# Patient Record
Sex: MALE | Race: WHITE | Employment: FULL TIME | URBAN - METROPOLITAN AREA
[De-identification: names, ages, dates, MRNs, and addresses within clinical notes are randomized per-mention and may not be internally consistent; named-entity substitution may affect disease eponyms.]

---

## 2018-08-17 ENCOUNTER — OFFICE VISIT (OUTPATIENT)
Dept: PRIMARY CARE CLINIC | Age: 38
End: 2018-08-17
Payer: COMMERCIAL

## 2018-08-17 VITALS
HEART RATE: 68 BPM | BODY MASS INDEX: 33.37 KG/M2 | WEIGHT: 251.8 LBS | RESPIRATION RATE: 14 BRPM | SYSTOLIC BLOOD PRESSURE: 120 MMHG | DIASTOLIC BLOOD PRESSURE: 80 MMHG | HEIGHT: 73 IN | OXYGEN SATURATION: 97 % | TEMPERATURE: 98.4 F

## 2018-08-17 DIAGNOSIS — K21.9 GASTROESOPHAGEAL REFLUX DISEASE WITHOUT ESOPHAGITIS: ICD-10-CM

## 2018-08-17 DIAGNOSIS — Z00.00 ANNUAL PHYSICAL EXAM: Primary | ICD-10-CM

## 2018-08-17 PROCEDURE — 99385 PREV VISIT NEW AGE 18-39: CPT | Performed by: FAMILY MEDICINE

## 2018-08-17 ASSESSMENT — PATIENT HEALTH QUESTIONNAIRE - PHQ9
SUM OF ALL RESPONSES TO PHQ QUESTIONS 1-9: 0
SUM OF ALL RESPONSES TO PHQ QUESTIONS 1-9: 0
SUM OF ALL RESPONSES TO PHQ9 QUESTIONS 1 & 2: 0
1. LITTLE INTEREST OR PLEASURE IN DOING THINGS: 0
2. FEELING DOWN, DEPRESSED OR HOPELESS: 0

## 2018-08-17 ASSESSMENT — ENCOUNTER SYMPTOMS
GASTROINTESTINAL NEGATIVE: 1
BACK PAIN: 0
COUGH: 0
BLOOD IN STOOL: 0
CHEST TIGHTNESS: 0
SHORTNESS OF BREATH: 0
CONSTIPATION: 0
VOMITING: 0
RESPIRATORY NEGATIVE: 1
EYE DISCHARGE: 0
NAUSEA: 0
DIARRHEA: 0
APNEA: 0
PHOTOPHOBIA: 0
EYES NEGATIVE: 1
ABDOMINAL PAIN: 0

## 2018-08-17 NOTE — PROGRESS NOTES
Subjective:      Patient ID: Lory Pfeiffer is a 43 y.o. male who presents today for:  Chief Complaint   Patient presents with    Annual Exam     Pt. is here today for a physical exam for work. He does have acid reflux and has been taking nexium for 2 years. HPI  Annual exam  Pt presents today for an work physical. He does have acid reflux and takes Nexium with relief. He states he feels well today. History reviewed. No pertinent past medical history. History reviewed. No pertinent surgical history. History reviewed. No pertinent family history. Social History     Social History    Marital status: Single     Spouse name: N/A    Number of children: N/A    Years of education: N/A     Occupational History    Not on file. Social History Main Topics    Smoking status: Never Smoker    Smokeless tobacco: Never Used    Alcohol use 1.2 oz/week     1 Glasses of wine, 1 Cans of beer per week      Comment: occaisonal    Drug use: No    Sexual activity: Yes     Partners: Female     Other Topics Concern    Not on file     Social History Narrative    No narrative on file     Allergies:  Patient has no allergy information on record. Review of Systems   Constitutional: Negative. Negative for activity change, appetite change, fatigue and fever. HENT: Negative. Negative for congestion, nosebleeds and tinnitus. Eyes: Negative. Negative for photophobia, discharge and visual disturbance. Respiratory: Negative. Negative for apnea, cough, chest tightness and shortness of breath. Cardiovascular: Negative. Negative for chest pain and palpitations. Gastrointestinal: Negative. Negative for abdominal pain, blood in stool, constipation, diarrhea, nausea and vomiting. Genitourinary: Negative. Negative for dysuria, frequency, hematuria and urgency. Musculoskeletal: Negative. Negative for back pain and neck pain. Skin: Negative. Negative for pallor. Neurological: Negative.   Negative for dizziness, syncope, speech difficulty, weakness, light-headedness and headaches. Psychiatric/Behavioral: Negative. Objective:   /80 (Site: Left Arm, Position: Sitting, Cuff Size: Large Adult)   Pulse 68   Temp 98.4 °F (36.9 °C) (Oral)   Resp 14   Ht 6' 1\" (1.854 m)   Wt 251 lb 12.8 oz (114.2 kg)   SpO2 97%   BMI 33.22 kg/m²     Physical Exam   Constitutional: He is oriented to person, place, and time. He appears well-developed and well-nourished. HENT:   Head: Normocephalic and atraumatic. Eyes: Pupils are equal, round, and reactive to light. Conjunctivae and EOM are normal.   Neck: Normal range of motion. Neck supple. No JVD present. No thyromegaly present. Cardiovascular: Normal rate, regular rhythm, normal heart sounds and intact distal pulses. Exam reveals no gallop. No murmur heard. Pulmonary/Chest: Effort normal and breath sounds normal. No respiratory distress. He has no wheezes. Abdominal: Soft. Bowel sounds are normal. He exhibits no distension. There is no tenderness. Musculoskeletal: Normal range of motion. He exhibits no edema. Lymphadenopathy:     He has no cervical adenopathy. Neurological: He is alert and oriented to person, place, and time. He has normal reflexes. No cranial nerve deficit. Skin: Skin is warm and dry. No rash noted. Psychiatric: He has a normal mood and affect. Thought content normal.   Nursing note and vitals reviewed. Assessment:      Diagnosis Orders   1. Annual physical exam  Lipid Panel    Comprehensive Metabolic Panel    CBC Auto Differential    Ambulatory referral to Gastroenterology   2.  Gastroesophageal reflux disease without esophagitis         Plan:      Orders Placed This Encounter   Procedures    Lipid Panel     Standing Status:   Future     Standing Expiration Date:   8/17/2019     Order Specific Question:   Is Patient Fasting?/# of Hours     Answer:   yes    Comprehensive Metabolic Panel     Standing Status:   Future Standing Expiration Date:   8/17/2019    CBC Auto Differential     Standing Status:   Future     Standing Expiration Date:   8/17/2019    Ambulatory referral to Gastroenterology     Referral Priority:   Routine     Referral Type:   Consult for Advice and Opinion     Referral Reason:   Specialty Services Required     Referred to Provider:   Karlee Macedo MD     Requested Specialty:   Gastroenterology     Number of Visits Requested:   1         Controlled Substances Monitoring:      Return in about 1 year (around 8/17/2019) for Review of Labs & Diagnostic Testing, Routine follow up. BULMARO Nelson, CMA  , am scribing for and in the presence of Aroldo Burrell DO. Electronically signed by :  BULMARO Sarkar, [address] [facility], Aroldo Burrell DO, personally performed the services described in this documentation, as scribed by Magda Bain in my presence, and it is both accurate and complete.  Electronically signed by: Aroldo Burrell DO    8/22/18 11:04 PM    Aroldo Burrell DO

## 2018-10-31 ENCOUNTER — OFFICE VISIT (OUTPATIENT)
Dept: GASTROENTEROLOGY | Age: 38
End: 2018-10-31
Payer: COMMERCIAL

## 2018-10-31 VITALS
TEMPERATURE: 98.7 F | BODY MASS INDEX: 33.53 KG/M2 | HEART RATE: 77 BPM | HEIGHT: 73 IN | WEIGHT: 253 LBS | OXYGEN SATURATION: 98 %

## 2018-10-31 DIAGNOSIS — K21.9 GASTROESOPHAGEAL REFLUX DISEASE WITHOUT ESOPHAGITIS: Primary | ICD-10-CM

## 2018-10-31 PROCEDURE — 99203 OFFICE O/P NEW LOW 30 MIN: CPT | Performed by: INTERNAL MEDICINE

## 2018-10-31 RX ORDER — ESOMEPRAZOLE MAGNESIUM 20 MG/1
20 FOR SUSPENSION ORAL DAILY
COMMUNITY

## 2018-10-31 NOTE — PROGRESS NOTES
Gastroenterology Clinic Visit    Edvin Olivera  [id number]  Chief Complaint   Patient presents with    Consultation     HPI: 43 y.o. male presents to the clinic with long-standing symptoms of retrosternal discomfort/easy regurgitation. Patient wishing to find out if he can continue the PPI on a long-term basis. Patient currently on over-the-counter Nexium at 20 mg once daily with good control of his symptoms. Patient reports a recurrence of symptoms at night on the days that he misses the medication. His symptoms are worse when he lays flat and if he eats late. He denies any overt symptoms of heartburn. He has been on Nexium for last couple of years. He does report some weight gain over the last few years after stopping soccer. Patient otherwise has active lifestyle and plays volleyball for exercise. Patient denies smoking, he does drink alcohol occasionally. He does have nocturnal symptoms especially when he eats late. His work especially when the shift rotates affects his daily routine and eating habits. Review of Systems   All other systems reviewed and are negative. History reviewed. No pertinent past medical history. History reviewed. No pertinent surgical history. No current outpatient prescriptions on file prior to visit. No current facility-administered medications on file prior to visit. History reviewed. No pertinent family history.    Social History     Social History    Marital status: Single     Spouse name: N/A    Number of children: N/A    Years of education: N/A     Social History Main Topics    Smoking status: Never Smoker    Smokeless tobacco: Never Used    Alcohol use 1.2 oz/week     1 Glasses of wine, 1 Cans of beer per week      Comment: occaisonal    Drug use: No    Sexual activity: Yes     Partners: Female     Other Topics Concern    None     Social History Narrative    None       Pulse 77, temperature 98.7 °F (37.1 °C), height 6' 1\" (1.854 m), weight